# Patient Record
Sex: MALE | Race: OTHER | Employment: PART TIME | URBAN - METROPOLITAN AREA
[De-identification: names, ages, dates, MRNs, and addresses within clinical notes are randomized per-mention and may not be internally consistent; named-entity substitution may affect disease eponyms.]

---

## 2017-05-03 ENCOUNTER — HOSPITAL ENCOUNTER (EMERGENCY)
Age: 32
Discharge: HOME | End: 2017-05-03
Attending: EMERGENCY MEDICINE
Payer: SELF-PAY

## 2017-05-03 VITALS
BODY MASS INDEX: 32.8 KG/M2 | DIASTOLIC BLOOD PRESSURE: 88 MMHG | HEIGHT: 68 IN | OXYGEN SATURATION: 99 % | TEMPERATURE: 98.3 F | RESPIRATION RATE: 16 BRPM | WEIGHT: 216.4 LBS | HEART RATE: 65 BPM | SYSTOLIC BLOOD PRESSURE: 135 MMHG

## 2017-05-03 DIAGNOSIS — L23.7 POISON IVY DERMATITIS: Primary | ICD-10-CM

## 2017-05-03 PROCEDURE — 99283 EMERGENCY DEPT VISIT LOW MDM: CPT

## 2017-05-03 PROCEDURE — 96372 THER/PROPH/DIAG INJ SC/IM: CPT

## 2017-05-03 PROCEDURE — 74011250636 HC RX REV CODE- 250/636: Performed by: PHYSICIAN ASSISTANT

## 2017-05-03 PROCEDURE — 74011250637 HC RX REV CODE- 250/637: Performed by: PHYSICIAN ASSISTANT

## 2017-05-03 RX ORDER — DIPHENHYDRAMINE HCL 25 MG
25 CAPSULE ORAL
Qty: 20 CAP | Refills: 0 | Status: SHIPPED | OUTPATIENT
Start: 2017-05-03 | End: 2017-05-13

## 2017-05-03 RX ORDER — PREDNISONE 20 MG/1
TABLET ORAL
Qty: 20 TAB | Refills: 0 | Status: SHIPPED | OUTPATIENT
Start: 2017-05-03 | End: 2019-08-30

## 2017-05-03 RX ORDER — DIPHENHYDRAMINE HYDROCHLORIDE 50 MG/ML
50 INJECTION, SOLUTION INTRAMUSCULAR; INTRAVENOUS
Status: COMPLETED | OUTPATIENT
Start: 2017-05-03 | End: 2017-05-03

## 2017-05-03 RX ORDER — CEPHALEXIN 500 MG/1
500 CAPSULE ORAL 4 TIMES DAILY
Qty: 40 CAP | Refills: 0 | Status: SHIPPED | OUTPATIENT
Start: 2017-05-03 | End: 2017-05-13

## 2017-05-03 RX ORDER — DEXAMETHASONE SODIUM PHOSPHATE 10 MG/ML
10 INJECTION INTRAMUSCULAR; INTRAVENOUS
Status: COMPLETED | OUTPATIENT
Start: 2017-05-03 | End: 2017-05-03

## 2017-05-03 RX ORDER — CEPHALEXIN 500 MG/1
500 CAPSULE ORAL
Status: COMPLETED | OUTPATIENT
Start: 2017-05-03 | End: 2017-05-03

## 2017-05-03 RX ADMIN — DEXAMETHASONE SODIUM PHOSPHATE 10 MG: 10 INJECTION, SOLUTION INTRAMUSCULAR; INTRAVENOUS at 22:53

## 2017-05-03 RX ADMIN — DIPHENHYDRAMINE HYDROCHLORIDE 50 MG: 50 INJECTION, SOLUTION INTRAMUSCULAR; INTRAVENOUS at 22:53

## 2017-05-03 RX ADMIN — CEPHALEXIN 500 MG: 500 CAPSULE ORAL at 22:53

## 2017-05-04 NOTE — DISCHARGE INSTRUCTIONS
Demaris Economy y zumaque venenosos: Instrucciones de cuidado - [ Jerome Cost, [place], and [place]: Care Instructions ]  Instrucciones de cuidado    La hiedra, el joi y el zumaque venenosos son plantas que pueden causar salpullido cuando entran en contacto con la piel. El enrojecimiento y la comezón del salpullido pueden aparecer en forma de vetas o damian que pueden causar ampollas llenas de líquido o grandes ronchas. El salpullido es causado por marilin reacción alérgica al aceite de la planta. El salpullido puede ocurrir cuando usted toca la planta, o al tocar marilin prenda de vestir, el pelaje de [place], equipos deportivos, herramientas de jardinería u otros objetos que hayan estado en contacto con la planta. Usted no puede contraer ni transmitir el salpullido aunque haya tocado el salpullido o el líquido de las [place], ya que el aceite de la planta habrá sido absorbido o retirado de la piel. Puede parecer que el salpullido se está propagando, markell aún está desarrollándose a partir de un contacto previo o young usted ha tocado algo que todavía tiene aceite de la planta. La atención de seguimiento es marilin parte clave de kathleen tratamiento y seguridad. Asegúrese de hacer y acudir a todas las citas, y llame a kathleen médico si está teniendo problemas. También es marilin buena idea saber los resultados de stephon exámenes y mantener marilin lista de los medicamentos que hipolito. ¿Cómo puede cuidarse en el hogar? · Si kathleen médico le recetó marilin crema, úsela según las indicaciones. Si kathleen médico le recetó medicamentos, tómelos exactamente según las indicaciones. Llame a kathleen médico si claudia estar teniendo problemas con kathleen medicamento. · Use compresas frías y húmedas para reducir la comezón. · Manténgase fresco y evite el sol. · Deje el salpullido en contacto con el aire. · Lave todas las prendas de vestir y otros objetos que hayan estado en contacto con el aceite de la planta.   · Evite el uso de la mayoría de las lociones y [employer] salpullido se cure. Marilin loción de calamina puede ayudarle a aliviar los síntomas del salpullido. Úsela 3 o 4 veces al día. Cómo prevenir la exposición a la hiedra venenosa  Si sabe que va a estar cerca de la hiedra, del joi o del zumaque venenosos, puede probar estas opciones:  · Use un producto diseñado para ayudar a prevenir que el aceite de la planta entre en contacto con la piel. Estos productos, jericho Ivy X Pre-Contact Skin Solution, vienen en lociones, aerosoles o toallitas. Usted puede aplicarse el producto en la piel antes de salir al [facility]. · Si usted no utilizó un producto preventivo y ha tenido contacto con el aceite de la planta, límpiese la piel lo más pronto posible. Use un producto jericho Tecnu Original Outdoor Skin Cleanser. Estos productos también pueden usarse para limpiar el aceite de las plantas de la ropa o herramientas. ¿Cuándo debe pedir ayuda? Llame a kathleen médico ahora mismo o busque atención médica inmediata si:  · Kathleen salpullido empeora, y comienza a sentirse mal y Janene Sylvia, rigidez en el sixto, náuseas y vómito. · Tiene señales de infección, tales jericho:  ¨ Un aumento del dolor, la hinchazón, el enrojecimiento o la temperatura en la sara. ¨ Vetas rojizas que comienzan donde está el salpullido. ¨ Pus que sale del salpullido. Jules Clapper. Preste especial atención a los cambios en kathleen rob y asegúrese de comunicarse con kathleen médico si:  · Tiene ampollas o moretones nuevos, o el salpullido se extiende y parece marilin quemadura solar. · El salpullido HIMANSHU, o vuelve después de zach saritha desaparecido. · Piensa que un medicamento está empeorando el salpullido. · El salpullido no desaparece después de 1 a 2 semanas de tratamiento en el hogar. · Siente dolor en las articulaciones o en otras partes del cuerpo junto con el salpullido. ¿Dónde puede encontrar más información en inglés? Lora Sibley a http://delma-roxane.info/.   Aleyda Serenity B475 en la búsqueda para aprender Sai Dates acerca de \"Leann, joi y margaret venenosos: Instrucciones de cuidado - [ Adore Larsen, [place], and [place]: Care Instructions ]. \"  Revisado: 13 octubre, 2016  Versión del contenido: 11.2  © 4350-9878 [employer]wise[employer] Inc[employer]. Las instrucciones de cuidado fueron adaptadas bajo licencia por [employer] (which disclaims liability or warranty for this information). Si usted tiene Hollister Rowley afección médica o sobre estas instrucciones, siempre pregunte a kathleen profesional de rob. Healthwise, Incorporated niega toda garantía o responsabilidad por kathleen uso de esta información. We hope that we have addressed all of your medical concerns. The examination and treatment you received in the Emergency Department were for an emergent problem and were not intended as complete care. It is important that you follow up with your healthcare provider(s) for ongoing care. If your symptoms worsen or do not improve as expected, and you are unable to reach your usual health care provider(s), you should return to the Emergency Department. Today's healthcare is undergoing tremendous change, and patient satisfaction surveys are one of the many tools to assess the quality of medical care. You may receive a survey from the [employer] regarding your experience in the Emergency Department. I hope that your experience has been completely positive, particularly the medical care that I provided. As such, please participate in the survey; anything less than excellent does not meet my expectations or intentions. [facility] and [address] [place] [address] participate in nationally recognized quality of care measures. If your blood pressure is greater than 120/80, as reported below, we urge that you seek medical care to address the potential of high blood pressure, commonly known as hypertension.    Hypertension can be hereditary or can be caused by certain medical conditions, pain, stress, or \"white coat syndrome. \"       Please make an appointment with your health care provider(s) for follow up of your Emergency Department visit. VITALS:   Patient Vitals for the past 8 hrs:   Temp Pulse Resp BP SpO2   05/03/17 2240 98.3 °F (36.8 °C) 65 16 135/88 99 %          Thank you for allowing us to provide you with medical care today. We realize that you have many choices for your emergency care needs. Please choose us in the future for any continued health care needs. Deborah Ayers, [facility].   Office: [phone]

## 2017-05-04 NOTE — ED PROVIDER NOTES
HPI Comments: 32 yo male with no significant PMH here for evaluation of contact dermitis. States he was exposed to poison ivy 5-6 days ago when he was helping a friend move. Noticed swelling/blisters to bilateral arms and abdomen. Friend with same who he was working with. Denies fever, chills, CP, SOB, abd pain, flank pain, urinary symptoms. Former smoker. Patient is a 33 y.o. male presenting with skin problem. The history is provided by the patient. Skin Problem    This is a new problem. The current episode started more than 2 days ago. The problem has been gradually worsening. The problem is associated with plant contact. There has been no fever. The rash is present on the abdomen, right arm and left arm. The pain is at a severity of 7/10. The pain is mild. The pain has been constant since onset. Associated symptoms include weeping. He has tried nothing for the symptoms. History reviewed. No pertinent past medical history. History reviewed. No pertinent surgical history. History reviewed. No pertinent family history. Social History     Social History    Marital status: SINGLE     Spouse name: N/A    Number of children: N/A    Years of education: N/A     Occupational History    Not on file. Social History Main Topics    Smoking status: Former Smoker    Smokeless tobacco: Not on file    Alcohol use No    Drug use: No    Sexual activity: Not on file     Other Topics Concern    Not on file     Social History Narrative    No narrative on file         ALLERGIES: Review of patient's allergies indicates no known allergies. Review of Systems   Constitutional: Negative. HENT: Negative for ear discharge. Eyes: Negative for photophobia, pain, discharge and visual disturbance. Respiratory: Negative for apnea, cough, chest tightness and shortness of breath. Cardiovascular: Negative for chest pain, palpitations and leg swelling.    Gastrointestinal: Negative for abdominal distention, abdominal pain and blood in stool. Genitourinary: Negative for difficulty urinating, dysuria, flank pain, frequency and hematuria. Musculoskeletal: Negative for back pain, gait problem, joint swelling and neck pain. Skin: Positive for color change and rash. Negative for pallor. Neurological: Negative for dizziness, syncope, weakness, numbness and headaches. Psychiatric/Behavioral: Negative for behavioral problems and confusion. The patient is not nervous/anxious. Vitals:    05/03/17 2240   BP: 135/88   Pulse: 65   Resp: 16   Temp: 98.3 °F (36.8 °C)   SpO2: 99%   Weight: 98.2 kg (216 lb 6.4 oz)   Height: 5' 8\" (1.727 m)            Physical Exam   Constitutional: He is oriented to person, place, and time. He appears well-developed and well-nourished. HENT:   Head: Normocephalic and atraumatic. Right Ear: External ear normal.   Left Ear: External ear normal.   Nose: Nose normal.   Mouth/Throat: Oropharynx is clear and moist.   Eyes: Conjunctivae and EOM are normal. Pupils are equal, round, and reactive to light. Right eye exhibits no discharge. Left eye exhibits no discharge. Neck: Normal range of motion. Neck supple. Cardiovascular: Normal rate, regular rhythm, normal heart sounds and intact distal pulses. Pulmonary/Chest: Effort normal and breath sounds normal.   Abdominal: Soft. Bowel sounds are normal. He exhibits no distension. There is no tenderness. There is no rebound and no guarding. Musculoskeletal: Normal range of motion. He exhibits no edema or tenderness. Neurological: He is alert and oriented to person, place, and time. No cranial nerve deficit. Coordination normal.   Skin: Skin is warm. Rash noted. Blistering linear rash to bilateral forearms and liner rash to abdomen; no surrounding edema/erythema    Psychiatric: He has a normal mood and affect. His behavior is normal. Judgment and thought content normal.   Nursing note and vitals reviewed. MDM  ED Course       Procedures    Patient's results have been reviewed with them. Patient and/or family have verbally conveyed their understanding and agreement of the patient's signs, symptoms, diagnosis, treatment and prognosis and additionally agree to follow up as recommended or return to the Emergency Room should their condition change prior to follow-up. Discharge instructions have also been provided to the patient with some educational information regarding their diagnosis as well a list of reasons why they would want to return to the ER prior to their follow-up appointment should their condition change.   YOLIE Mckinney

## 2017-05-04 NOTE — ED NOTES
Dressings applied to bilateral arms. Non adherant dressing applied, gauze wrap, and coban. Patient tolerated well. PVS intact.

## 2017-05-04 NOTE — ED TRIAGE NOTES
Patient arrives to ED with a rash to bilat arm and abdo that he states is from poison ivy, states he came in contact with poison ivy of Friday, patient presents with weeping blisters to both arm, swelling noted.

## 2019-06-18 ENCOUNTER — HOSPITAL ENCOUNTER (OUTPATIENT)
Dept: LAB | Age: 34
Discharge: HOME | End: 2019-06-18

## 2019-06-18 PROCEDURE — 80053 COMPREHEN METABOLIC PANEL: CPT

## 2019-06-18 PROCEDURE — 84443 ASSAY THYROID STIM HORMONE: CPT

## 2019-06-18 PROCEDURE — 80061 LIPID PANEL: CPT

## 2019-06-19 LAB
ALBUMIN SERPL-MCNC: 4.5 G/DL (ref 3.5–5)
ALBUMIN/GLOB SERPL: 1.5 {RATIO} (ref 1.1–2.2)
ALP SERPL-CCNC: 94 U/L (ref 45–117)
ALT SERPL-CCNC: 100 U/L (ref 12–78)
ANION GAP SERPL CALC-SCNC: 9 MMOL/L (ref 5–15)
AST SERPL-CCNC: 31 U/L (ref 15–37)
BILIRUB SERPL-MCNC: 0.3 MG/DL (ref 0.2–1)
BUN SERPL-MCNC: 14 MG/DL (ref 6–20)
BUN/CREAT SERPL: 16 (ref 12–20)
CALCIUM SERPL-MCNC: 9.1 MG/DL (ref 8.5–10.1)
CHLORIDE SERPL-SCNC: 106 MMOL/L (ref 97–108)
CHOLEST SERPL-MCNC: 219 MG/DL
CO2 SERPL-SCNC: 25 MMOL/L (ref 21–32)
CREAT SERPL-MCNC: 0.86 MG/DL (ref 0.7–1.3)
GLOBULIN SER CALC-MCNC: 3.1 G/DL (ref 2–4)
GLUCOSE SERPL-MCNC: 100 MG/DL (ref 65–100)
HDLC SERPL-MCNC: 34 MG/DL
HDLC SERPL: 6.4 {RATIO} (ref 0–5)
LDLC SERPL CALC-MCNC: 147.6 MG/DL (ref 0–100)
LIPID PROFILE,FLP: ABNORMAL
POTASSIUM SERPL-SCNC: 4.1 MMOL/L (ref 3.5–5.1)
PROT SERPL-MCNC: 7.6 G/DL (ref 6.4–8.2)
SODIUM SERPL-SCNC: 140 MMOL/L (ref 136–145)
TRIGL SERPL-MCNC: 187 MG/DL (ref ?–150)
TSH SERPL DL<=0.05 MIU/L-ACNC: 1.41 UIU/ML (ref 0.36–3.74)
VLDLC SERPL CALC-MCNC: 37.4 MG/DL

## 2019-08-29 ENCOUNTER — APPOINTMENT (OUTPATIENT)
Dept: GENERAL RADIOLOGY | Age: 34
End: 2019-08-29
Attending: EMERGENCY MEDICINE
Payer: SELF-PAY

## 2019-08-29 ENCOUNTER — HOSPITAL ENCOUNTER (EMERGENCY)
Age: 34
Discharge: HOME | End: 2019-08-30
Attending: EMERGENCY MEDICINE
Payer: SELF-PAY

## 2019-08-29 VITALS
HEART RATE: 90 BPM | TEMPERATURE: 99.7 F | RESPIRATION RATE: 18 BRPM | SYSTOLIC BLOOD PRESSURE: 116 MMHG | DIASTOLIC BLOOD PRESSURE: 70 MMHG | OXYGEN SATURATION: 96 %

## 2019-08-29 DIAGNOSIS — J02.0 STREP THROAT: Primary | ICD-10-CM

## 2019-08-29 LAB
APPEARANCE UR: CLEAR
BACTERIA URNS QL MICRO: NEGATIVE /HPF
BILIRUB UR QL: NEGATIVE
COLOR UR: ABNORMAL
EPITH CASTS URNS QL MICRO: ABNORMAL /LPF
FLUAV AG NPH QL IA: NEGATIVE
FLUBV AG NOSE QL IA: NEGATIVE
GLUCOSE UR STRIP.AUTO-MCNC: NEGATIVE MG/DL
HGB UR QL STRIP: NEGATIVE
KETONES UR QL STRIP.AUTO: NEGATIVE MG/DL
LEUKOCYTE ESTERASE UR QL STRIP.AUTO: NEGATIVE
NITRITE UR QL STRIP.AUTO: NEGATIVE
PH UR STRIP: 7 [PH] (ref 5–8)
PROT UR STRIP-MCNC: 30 MG/DL
RBC #/AREA URNS HPF: ABNORMAL /HPF (ref 0–5)
S PYO AG THROAT QL: POSITIVE
SP GR UR REFRACTOMETRY: 1.01 (ref 1–1.03)
UR CULT HOLD, URHOLD: NORMAL
UROBILINOGEN UR QL STRIP.AUTO: 1 EU/DL (ref 0.2–1)
WBC URNS QL MICRO: ABNORMAL /HPF (ref 0–4)

## 2019-08-29 PROCEDURE — 87880 STREP A ASSAY W/OPTIC: CPT

## 2019-08-29 PROCEDURE — 74011250637 HC RX REV CODE- 250/637: Performed by: EMERGENCY MEDICINE

## 2019-08-29 PROCEDURE — 71046 X-RAY EXAM CHEST 2 VIEWS: CPT

## 2019-08-29 PROCEDURE — 99284 EMERGENCY DEPT VISIT MOD MDM: CPT

## 2019-08-29 PROCEDURE — 87804 INFLUENZA ASSAY W/OPTIC: CPT

## 2019-08-29 PROCEDURE — 81001 URINALYSIS AUTO W/SCOPE: CPT

## 2019-08-29 RX ORDER — IBUPROFEN 400 MG/1
800 TABLET ORAL
Status: COMPLETED | OUTPATIENT
Start: 2019-08-29 | End: 2019-08-29

## 2019-08-29 RX ADMIN — IBUPROFEN 800 MG: 400 TABLET ORAL at 21:51

## 2019-08-30 PROCEDURE — 74011250637 HC RX REV CODE- 250/637: Performed by: EMERGENCY MEDICINE

## 2019-08-30 RX ORDER — AMOXICILLIN 500 MG/1
500 TABLET, FILM COATED ORAL 2 TIMES DAILY
Qty: 20 TAB | Refills: 0 | Status: SHIPPED | OUTPATIENT
Start: 2019-08-30 | End: 2019-09-09

## 2019-08-30 RX ORDER — AMOXICILLIN 500 MG/1
500 CAPSULE ORAL
Status: COMPLETED | OUTPATIENT
Start: 2019-08-30 | End: 2019-08-30

## 2019-08-30 RX ADMIN — AMOXICILLIN 500 MG: 500 CAPSULE ORAL at 00:22

## 2019-08-30 NOTE — ED PROVIDER NOTES
37 yo male presents to ER for evaluation of generalized body aches. Onset yesterday. + cough onset yesterday. + headache and sore throat. No abdominal pain. No nausea or vomiting. No chest pain, shortness of breath. + dysuria. Pt took tylenol with mild relief . Subjective fever. No known sick contacts. No travel. Social hx  Nonsmoker      The history is provided by the patient. No past medical history on file. No past surgical history on file. No family history on file. Social History     Socioeconomic History    Marital status: SINGLE     Spouse name: Not on file    Number of children: Not on file    Years of education: Not on file    Highest education level: Not on file   Occupational History    Not on file   Social Needs    Financial resource strain: Not on file    Food insecurity:     Worry: Not on file     Inability: Not on file    Transportation needs:     Medical: Not on file     Non-medical: Not on file   Tobacco Use    Smoking status: Former Smoker   Substance and Sexual Activity    Alcohol use: No    Drug use: No    Sexual activity: Not on file   Lifestyle    Physical activity:     Days per week: Not on file     Minutes per session: Not on file    Stress: Not on file   Relationships    Social connections:     Talks on phone: Not on file     Gets together: Not on file     Attends Denominational service: Not on file     Active member of club or organization: Not on file     Attends meetings of clubs or organizations: Not on file     Relationship status: Not on file    Intimate partner violence:     Fear of current or ex partner: Not on file     Emotionally abused: Not on file     Physically abused: Not on file     Forced sexual activity: Not on file   Other Topics Concern    Not on file   Social History Narrative    Not on file         ALLERGIES: Patient has no known allergies. Review of Systems   Constitutional: Positive for fever. Negative for chills.    HENT: Positive for congestion, rhinorrhea and sore throat. Negative for ear discharge, ear pain and nosebleeds. Respiratory: Positive for cough. Negative for shortness of breath. Cardiovascular: Negative for chest pain and palpitations. Gastrointestinal: Positive for nausea. Negative for abdominal pain, blood in stool, diarrhea and vomiting. Genitourinary: Positive for dysuria. Negative for flank pain, frequency and urgency. Musculoskeletal: Negative for arthralgias, myalgias, neck pain and neck stiffness. Skin: Negative for rash and wound. Neurological: Positive for headaches. Negative for dizziness and numbness. All other systems reviewed and are negative. Vitals:    08/29/19 2120   Pulse: (!) 114   SpO2: 98%            Physical Exam   Constitutional: Well-developed and well-nourished. No distress. HENT:   Right Ear: Tympanic membrane normal. No tragal or auricle tenderness. Left Ear: Tympanic membrane normal.  No tragal or auricle tenderness bilaterally. No drainage. Nose: No nasal discharge. Mouth/Throat: Mucous membranes are moist. No dental caries. No tonsillar exudate. Oropharynx is clear. Pharynx is normal.   Uvula midline, no trismus, drooling, submandibular swelling. Tonsils 2+ bilaterally. No exudates. Tolerating secretions without problem. No mastoid tenderness. Eyes: Conjunctivae are normal. Pupils are equal, round, and reactive to light. Right eye exhibits no discharge. Left eye exhibits no discharge. Neck: Normal range of motion. Neck supple. No neck rigidity. NO MENINGEAL SIGNS  Cardiovascular: Normal rate and regular rhythm. Pulmonary/Chest: Effort normal and breath sounds normal. No stridor. No respiratory distress. no wheezes. no rales. Good air movement bilaterally   Abdominal: Soft. There is no hepatosplenomegaly. There is no rebound and no guarding. No pain with palpation. Musculoskeletal: Normal range of motion. no signs of injury.    Lymphadenopathy: No cervical adenopathy. Neurological:  alert. normal muscle tone. Coordination normal.   Skin: Skin is warm and dry. No petechiae, no purpura and no rash noted. Nursing note and vitals reviewed. MDM  Number of Diagnoses or Management Options  Diagnosis management comments: 46-year-old male presenting for flulike symptoms. Patient has muscle aches body aches. His lungs are clear. He is febrile slightly tachycardic. He has no meningeal signs. He is in no acute distress. Abdomen is soft nontender  Plan: X-ray, flu, strep, ibuprofen    12:07 AM  Patient is well hydrated, well appearing, and in no respiratory distress. Physical exam is reassuring, and without signs of serious illness. Rapid strep +. No trismus, stridor or increased work of breathing associated with sore throat. No meningeal signs. Heart rate and temperature down. Will discharge with supportive care and amoxicillin. Patient's results have been reviewed with them. Patient and/or family have verbally conveyed their understanding and agreement of the patient's signs, symptoms, diagnosis, treatment and prognosis and additionally agree to follow up as recommended or return to the Emergency Room should their condition change prior to follow-up. Discharge instructions have also been provided to the patient with some educational information regarding their diagnosis as well a list of reasons why they would want to return to the ER prior to their follow-up appointment should their condition change. Amount and/or Complexity of Data Reviewed  Discuss the patient with other providers: yes (ER attending-Pepe)    Patient Progress  Patient progress: stable         Procedures        Pt case including HPI, PE, and all available lab and radiology results has been discussed with attending physician. Opportunity to evaluate patient has been provided to ER attending. Discharge and prescription plan has been agreed upon.

## 2019-08-30 NOTE — DISCHARGE INSTRUCTIONS
Alternate ibuprofen every 6 hours as needed for fever with tylenol every 4-6 hours for fever. Amoxicillin:1 pill every 12 hours for 10 days  Increase liquid intake. Rest body  Return to ER for any fever not lowered by motrin and tyelnol, inability to eat or drink, vomiting, chest pain, shortness of breath, difficulty breathing, inability to swallow or open mouth     Faringitis estreptocócica: Instrucciones de cuidado - [ Strep Throat: Care Instructions ]  Instrucciones de cuidado    La faringitis estreptocócica es marilin infección bacteriana que provoca fiebre y dolor de garganta repentinos e intensos. La faringitis estreptocócica, causada por bacterias llamadas estreptococos, se trata con antibióticos. En ocasiones es necesaria marilin prueba de estreptococos para determinar si el dolor de garganta es causado por esta bacteria. El tratamiento puede ayudar a aliviar los síntomas y podría prevenir problemas futuros. La atención de seguimiento es marilin parte clave de kathleen tratamiento y seguridad. Asegúrese de hacer y acudir a todas las citas, y llame a kathleen médico si está teniendo problemas. También es marilin buena idea saber los resultados de stephon exámenes y mantener marilin lista de los medicamentos que hipolito. ¿Cómo puede cuidarse en el hogar? · [address] No deje de tomarlos por el hecho de sentirse mejor. Debe nichol todos los antibióticos hasta terminarlos. · La faringitis estreptocócica puede contagiarse a otros hasta 24 horas después de iniciado el tratamiento con antibióticos. Rajinder katrina [place], usted debe evitar el contacto con otras personas en el trabajo o kathleen hogar, especialmente con bebés y [place]. No estornude ni tosa cerca de [place], y [place] las zeferino con frecuencia. Checo Hug y utensilios de los demás y lávelos young con [place] y [place].   · Simon gárgaras con agua tibia con sal cada hora, jericho mínimo, para ayudar a reducir la hinchazón y sentirse mejor de la garganta. Mezcle 1 cucharadita de sal con 8 onzas líquidas (240 ml) de agua tibia. · [place] un analgésico (medicamento para el dolor) de venta machelle, jericho acetaminofén (Tylenol), ibuprofeno (Advil, Motrin) o naproxeno (Aleve). Josi y siga todas las instrucciones de la [place]. · Pruebe un aerosol anestésico o pastillas para la garganta de venta [place], los cuales pueden ayudar a aliviar el dolor de garganta. · [place] abundantes líquidos. Los líquidos podrían ayudar a aliviar la irritación de la garganta. Los líquidos calientes, jericho el té o las sopas, podrían ayudarle a sentirse mejor de la garganta. · Coma alimentos sólidos suaves y michael abundantes líquidos juarez. También podrían aliviar el dolor de garganta las paletas de agua de sabores, helado, huevos revueltos, sorbete y [place] de gelatina (jericho Jell-O). · Descanse mucho. · No fume y evite el humo de otros. Si necesita ayuda para dejar de fumar, hable con kathleen médico sobre programas y medicamentos para dejar de fumar. Estos pueden aumentar stephon probabilidades de dejar el hábito para siempre. · Utilice un vaporizador o humidificador para añadir humedad al aire de kathleen dormitorio. Siga las instrucciones para limpiar el aparato. ¿Cuándo debe pedir ayuda? Llame a kathleen médico ahora mismo o busque atención médica inmediata si:    · Tiene fiebre nueva o más lacie.     · Tiene fiebre junto con rigidez en el sixto o un dolor de muriel intenso.     · Tiene dificultades para tragar o estas empeoran.     · El dolor de garganta empeora mucho en un lado.     · El dolor se vuelve mucho más intenso en un lado de la garganta.    Preste especial atención a los cambios en kathleen rob y asegúrese de comunicarse con kathleen médico si:    · No mejora después de 2 días (48 horas).     · No mejora jericho se esperaba. ¿Dónde puede encontrar más información en inglés? Oralee Yaa a http://delma-roxane.info/.   Bernie Crest Z171 en la búsqueda para aprender más acerca de \"Faringitis estreptocócica: Instrucciones de cuidado - [ Strep Throat: Care Instructions ]. \"  Revisado: 21 octubre, 2018  Versión del contenido: 12.1  © 5836-4100 Healthwise, Incorporated. Las instrucciones de cuidado fueron adaptadas bajo licencia por [employer] (which disclaims liability or warranty for this information). Si usted tiene [place] afección médica o sobre estas instrucciones, siempre pregunte a kathleen profesional de rob. Healthwise, Incorporated niega toda garantía o responsabilidad por kathleen uso de esta información. [facility] Departments     For adult and child immunizations, family planning, TB screening, STD testing and women's health services. [place]: [place] [phone]      [address] 1822   [facility]   [address] [place] [address]: [place] [address] [place] [address] [phone]      [address] [place] [address]          [address] [place] [address]     For primary care services, woman and child wellness, and some clinics providing specialty care. [facility] [address] [facility]. [facility] [phone] 411 [facility] 200 [facility] 3614 [facility] [phone]   [address] [facility] [address] [place] [address] [phone]   [address]  "[employer] [address] [place] [address] [address]  [phone]   [address] [place] [address] [address] [place] [phone]   [facility] 81 [facility] [phone]   [facility] 10[address] [facility] [phone]   [facility]: [facility] [address], ext [facility], [address] [phone]     [place] [address] [phone]   [place] [address]  [phone]   [facility]  [address]al[address], 5755 [facility] Farooq ([email or web address]) [phone]         Sexual Health/Woman Wellness Clinics    For STD/HIV testing and treatment, pregnancy testing and services, men's health, birth control services, LGBT services, and hepatitis/HPV vaccine services. [place] & [place] for [place] [employer] [address]. [facility] [address] [facility] 1579 [address] T[address]A [address] [phone]   [facility] [address], 5th floor [phone]   Pregnancy [address] for Women [address]  [place] [phone] 68[address] [facility] [address] [facility] [facility] [address]   [phone]   [place]   [phone]   Women, Infant and Children's Services: [address] [phone]       600 [facility]   [phone]   4804 [facility]   [phone] 6125 [facility]     [phone]   [address] Crisis   1212 [facility] [phone]     Local Primary Care Physicians  [facility] [phone]  MD Don Estevez MD Chales Copping, MD [facility] [phone]  Gail Workman, [facility]  Beula Hopper, MD Larri Fothergill, MD Perla Redo, MD   [facility] [place] [address] [phone]  MD Liz Seymour MD [address] [facility] [phone]  MD Mark Brownlee MD Eward Edge, MD Britton Favor, MD   [facility] [phone]  OQHD IIFLXV NESS, MD Renee Juarez, NP [address] [facility] [address] [phone]  MD Refugio Gonzalez MD Arlon Cozier, MD Angela Si, MD Charlcie Rous, MD Youlanda Haller, MD Cleatis Bolk, MD   [facility] 128 Jaleel Gray MD [address] [phone]  MD Vidya Daniel, TORIE Diana, MD Brigette Salomon, MD Kalia Shoemaker, MD Vernon Rubi, MD Mindy Reinoso, MD   5042 [facility] [phone]  Jose Wellington, MD Julia Garcia, FNP  Maryellen Tovar, TORIE Fowler, MD Lane Foley, MD Rios Karimi, MD Savanah Churchill, MD [facility] [phone]  Corina Ash, MD Skyler Phoenix, MD Ranjith Colon, MD Yanelis Dumont, MD Aditi Veras, MD   [facility] [phone]  Alfonzo Vallejo, MD Alicia Alegria, MD [place] [phone]  MD Rae Amado, MD Aman Lee, MD [facility] [phone]  Princess Jones, MD Tonio Feliciano, MD Lata Zimmer, MD Cely Subramanian, MD Jung Matt, MD Pepe Ellington, NP  Jeff Ruelas MD 1619 [facility]   [phone]  MD Patricio Carlisle, MD Isaiah Mi MD   2102 [facility] [phone]  Alex Love, MD Kalyani Chawla, P  Quyenregerald Prim, PAEITAN Zhu Prim, FNP  Supa Esquivel, MD Janes Pelaez, TORIE Saini, DO Miscellaneous:  Selena Arambula [phone]

## 2019-08-30 NOTE — ED NOTES
Discharge instructions given to pt by RN in [facility] ([place] Pt educated on prescribed medications in teach back method and verbalizes understanding. Opportunity for questions provided. Pt ambulatory out of unit, in no acute distress and taken home by family.

## 2021-10-23 ENCOUNTER — HOSPITAL ENCOUNTER (EMERGENCY)
Age: 36
Discharge: HOME | End: 2021-10-23
Attending: EMERGENCY MEDICINE
Payer: COMMERCIAL

## 2021-10-23 ENCOUNTER — APPOINTMENT (OUTPATIENT)
Dept: CT IMAGING | Age: 36
End: 2021-10-23
Attending: EMERGENCY MEDICINE
Payer: COMMERCIAL

## 2021-10-23 ENCOUNTER — APPOINTMENT (OUTPATIENT)
Dept: GENERAL RADIOLOGY | Age: 36
End: 2021-10-23
Attending: EMERGENCY MEDICINE
Payer: COMMERCIAL

## 2021-10-23 VITALS
HEART RATE: 70 BPM | BODY MASS INDEX: 30.51 KG/M2 | TEMPERATURE: 98.5 F | RESPIRATION RATE: 18 BRPM | DIASTOLIC BLOOD PRESSURE: 61 MMHG | HEIGHT: 68 IN | OXYGEN SATURATION: 98 % | SYSTOLIC BLOOD PRESSURE: 110 MMHG | WEIGHT: 201.28 LBS

## 2021-10-23 DIAGNOSIS — S02.2XXA CLOSED FRACTURE OF NASAL BONE, INITIAL ENCOUNTER: Primary | ICD-10-CM
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
## 2021-10-23 PROCEDURE — 99284 EMERGENCY DEPT VISIT MOD MDM: CPT

## 2021-10-23 PROCEDURE — 72125 CT NECK SPINE W/O DYE: CPT

## 2021-10-23 PROCEDURE — 75810000275 HC EMERGENCY DEPT VISIT NO LEVEL OF CARE

## 2021-10-23 PROCEDURE — 70486 CT MAXILLOFACIAL W/O DYE: CPT

## 2021-10-23 PROCEDURE — 73610 X-RAY EXAM OF ANKLE: CPT

## 2021-10-23 PROCEDURE — 70450 CT HEAD/BRAIN W/O DYE: CPT
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
